# Patient Record
Sex: FEMALE | Race: WHITE | Employment: FULL TIME | URBAN - NONMETROPOLITAN AREA
[De-identification: names, ages, dates, MRNs, and addresses within clinical notes are randomized per-mention and may not be internally consistent; named-entity substitution may affect disease eponyms.]

---

## 2017-12-18 ENCOUNTER — HOSPITAL ENCOUNTER (OUTPATIENT)
Age: 27
Setting detail: SPECIMEN
Discharge: HOME | End: 2017-12-18
Payer: COMMERCIAL

## 2017-12-18 ENCOUNTER — OFFICE VISIT (OUTPATIENT)
Dept: OBGYN | Age: 27
End: 2017-12-18
Payer: COMMERCIAL

## 2017-12-18 VITALS
SYSTOLIC BLOOD PRESSURE: 128 MMHG | DIASTOLIC BLOOD PRESSURE: 80 MMHG | BODY MASS INDEX: 25.68 KG/M2 | HEIGHT: 66 IN | WEIGHT: 159.8 LBS

## 2017-12-18 DIAGNOSIS — Z01.419 WOMEN'S ANNUAL ROUTINE GYNECOLOGICAL EXAMINATION: ICD-10-CM

## 2017-12-18 DIAGNOSIS — Z01.419 WOMEN'S ANNUAL ROUTINE GYNECOLOGICAL EXAMINATION: Primary | ICD-10-CM

## 2017-12-18 LAB
BILIRUBIN, POC: NORMAL
BLOOD URINE, POC: NORMAL
CLARITY, POC: CLEAR
COLOR, POC: YELLOW
GLUCOSE URINE, POC: NORMAL
HGB, POC: 12.5
KETONES, POC: NORMAL
LEUKOCYTE EST, POC: NORMAL
NITRITE, POC: NORMAL
PH, POC: 6
PROTEIN, POC: NORMAL
SPECIFIC GRAVITY, POC: 1
UROBILINOGEN, POC: 0.2

## 2017-12-18 PROCEDURE — G0145 SCR C/V CYTO,THINLAYER,RESCR: HCPCS

## 2017-12-18 PROCEDURE — 99395 PREV VISIT EST AGE 18-39: CPT | Performed by: ADVANCED PRACTICE MIDWIFE

## 2017-12-18 RX ORDER — NORGESTIMATE AND ETHINYL ESTRADIOL 0.25-0.035
1 KIT ORAL DAILY
Qty: 1 PACKET | Refills: 12 | Status: SHIPPED | OUTPATIENT
Start: 2017-12-18 | End: 2018-12-04 | Stop reason: ALTCHOICE

## 2017-12-18 ASSESSMENT — ENCOUNTER SYMPTOMS
CONSTIPATION: 0
VOMITING: 0
NAUSEA: 0
SORE THROAT: 0
BACK PAIN: 0
SHORTNESS OF BREATH: 0
ABDOMINAL PAIN: 0
DIARRHEA: 0

## 2017-12-18 ASSESSMENT — PATIENT HEALTH QUESTIONNAIRE - PHQ9
2. FEELING DOWN, DEPRESSED OR HOPELESS: 0
1. LITTLE INTEREST OR PLEASURE IN DOING THINGS: 0
SUM OF ALL RESPONSES TO PHQ QUESTIONS 1-9: 0
SUM OF ALL RESPONSES TO PHQ9 QUESTIONS 1 & 2: 0

## 2017-12-18 NOTE — PROGRESS NOTES
YEARLY PHYSICAL    Date of service: 2017    Amy Kim  Is a 32 y.o.  single female    PT's PCP is: No primary care provider on file. : 1990                                             Subjective:       Patient's last menstrual period was 2017 (exact date). Are your menses regular: yes    OB History   No data available        History   Smoking Status    Never Smoker   Smokeless Tobacco    Never Used        History   Alcohol Use    Yes     Comment: socially       Family History   Problem Relation Age of Onset    Diabetes Mother      gestational   [facility] Diabetes Sister      gestastional       Any family history of breast or ovarian cancer: No    Any family history of blood clots: No      Allergies: Review of patient's allergies indicates no known allergies. Current Outpatient Prescriptions:     SPRINTEC 28 0.25-35 MG-MCG per tablet, , Disp: , Rfl:     norgestimate-ethinyl estradiol (ORTHO-CYCLEN, 28,) 0.25-35 MG-MCG per tablet, Take 1 tablet by mouth daily, Disp: 1 packet, Rfl: 12    History   Sexual Activity    Sexual activity: Yes    Partners: Male    Birth control/ protection: Pill       Any bleeding or pain with intercourse: No    Last Yearly:  2014    Last pap: 2014    Last HPV: yes, to abnormal, but unsure of reason     Last Mammogram: n/a    Last Dexascan n/a    Do you do self breast exams: No    History reviewed. No pertinent past medical history. History reviewed. No pertinent surgical history. Family History   Problem Relation Age of Onset    Diabetes Mother      gestational   [facility] Diabetes Sister      gestastional       Chief Complaint   Patient presents with    Gynecologic Exam     Pap, Pt states no issues or complaints           PE:  Vital Signs  Blood pressure 128/80, height 5' 6\" (1.676 m), weight 159 lb 12.8 oz (72.5 kg), last menstrual period 2017, not currently breastfeeding.

## 2017-12-29 LAB — CYTOLOGY REPORT: NORMAL

## 2018-12-04 ENCOUNTER — OFFICE VISIT (OUTPATIENT)
Dept: OBGYN | Age: 28
End: 2018-12-04
Payer: COMMERCIAL

## 2018-12-04 VITALS
HEIGHT: 66 IN | BODY MASS INDEX: 24.91 KG/M2 | DIASTOLIC BLOOD PRESSURE: 80 MMHG | WEIGHT: 155 LBS | SYSTOLIC BLOOD PRESSURE: 138 MMHG

## 2018-12-04 DIAGNOSIS — Z31.69 PROCREATIVE MANAGEMENT COUNSELING: ICD-10-CM

## 2018-12-04 DIAGNOSIS — Z01.419 WOMEN'S ANNUAL ROUTINE GYNECOLOGICAL EXAMINATION: Primary | ICD-10-CM

## 2018-12-04 PROCEDURE — 99395 PREV VISIT EST AGE 18-39: CPT | Performed by: ADVANCED PRACTICE MIDWIFE

## 2018-12-04 ASSESSMENT — PATIENT HEALTH QUESTIONNAIRE - PHQ9
1. LITTLE INTEREST OR PLEASURE IN DOING THINGS: 0
SUM OF ALL RESPONSES TO PHQ QUESTIONS 1-9: 0
SUM OF ALL RESPONSES TO PHQ QUESTIONS 1-9: 0
SUM OF ALL RESPONSES TO PHQ9 QUESTIONS 1 & 2: 0
2. FEELING DOWN, DEPRESSED OR HOPELESS: 0

## 2018-12-04 ASSESSMENT — ENCOUNTER SYMPTOMS
DIARRHEA: 0
ABDOMINAL PAIN: 0
NAUSEA: 0
BACK PAIN: 0
CONSTIPATION: 0
SORE THROAT: 0
SHORTNESS OF BREATH: 0

## 2018-12-04 NOTE — PROGRESS NOTES
Masses absent                                    Vaginal discharge: no vaginal discharge   Assessment and Plan          Diagnosis Orders   1. Women's annual routine gynecological examination     2. Procreative management counseling               I have discontinued Ms. Baker's SPRINTEC 28 and norgestimate-ethinyl estradiol. I am also having her maintain her Multiple Vitamins-Minerals (MULTIVITAMIN ADULTS PO). Return in about 1 year (around 12/4/2019) for yearly. There are no Patient Instructions on file for this visit. Over 50% of time spent on counseling and care coordination on: see assessment and plan,  She was also counseled on her preventative health maintenance recommendations and follow-up.         FF time: 15 min      Brenda Kaur,12/4/2018 4:15 PM

## 2020-10-28 ENCOUNTER — OFFICE VISIT (OUTPATIENT)
Dept: OBGYN CLINIC | Age: 30
End: 2020-10-28
Payer: COMMERCIAL

## 2020-10-28 ENCOUNTER — HOSPITAL ENCOUNTER (OUTPATIENT)
Age: 30
Setting detail: SPECIMEN
Discharge: HOME | End: 2020-10-28
Payer: COMMERCIAL

## 2020-10-28 VITALS
HEIGHT: 67 IN | BODY MASS INDEX: 24.52 KG/M2 | SYSTOLIC BLOOD PRESSURE: 131 MMHG | DIASTOLIC BLOOD PRESSURE: 82 MMHG | WEIGHT: 156.2 LBS

## 2020-10-28 PROCEDURE — 99395 PREV VISIT EST AGE 18-39: CPT | Performed by: NURSE PRACTITIONER

## 2020-10-28 NOTE — PROGRESS NOTES
YEARLY PHYSICAL    Date of service: 10/28/2020    Ed Zaragoza  Is a 26 y.o.  female    PT's PCP is: Nadya Reyna DO     : 1990                                         Chaperone for Intimate Exam   Chaperone was offered as part of the rooming process. Patient declined and agrees to continue with exam without a chaperone. Subjective:       No LMP recorded. (Menstrual status: Other - See Notes). Are your menses regular: no menses, still exclusively breastfeeding     OB History    Para Term  AB Living   1 1 1 0 0 1   SAB TAB Ectopic Molar Multiple Live Births   0 0 0 0 0 1      # Outcome Date GA Lbr Enrique/2nd Weight Sex Delivery Anes PTL Lv   1 Term 20 [id number]   F Vag-Spont   LOIS        Social History     Tobacco Use   Smoking Status Never Smoker   Smokeless Tobacco Never Used        Social History     Substance and Sexual Activity   Alcohol Use Yes    Comment: socially       Family History   Problem Relation Age of Onset    Diabetes Mother         gestational   [facility] [place] Diabetes Sister         gestastional    Diabetes Maternal Grandmother     Hypertension Maternal Grandmother     Other Other         No family h/o ovarian or breast cancer. No family h/o DVt. Any family history of breast or ovarian cancer: No    Any family history of blood clots: No      Allergies: Patient has no known allergies. No current outpatient medications on file. Social History     Substance and Sexual Activity   Sexual Activity Yes    Partners: Male       Any bleeding or pain with intercourse: No    Last Yearly:      Last pap: 2017    Last HPV: n/a    Last Mammogram: n/a    Last Dexascan n/a    Last colorectal screen- n/a     Do you do self breast exams: occasionally     Past Medical History:   Diagnosis Date    Abnormal Pap smear of cervix     Anxiety     H/O human papillomavirus infection        History reviewed. No pertinent surgical history. Family History   Problem Relation Age of Onset    Diabetes Mother         gestational   [facility] Diabetes Sister         gestastional    Diabetes Maternal Grandmother     Hypertension Maternal Grandmother     Other Other         No family h/o ovarian or breast cancer. No family h/o DVt. Chief Complaint   Patient presents with    Gynecologic Exam     Pap due. PE:  Vital Signs  Blood pressure 131/82, height 5' 7\" (1.702 m), weight 156 lb 3.2 oz (70.9 kg), currently breastfeeding. Estimated body mass index is 24.46 kg/m² as calculated from the following:    Height as of this encounter: 5' 7\" (1.702 m). Weight as of this encounter: 156 lb 3.2 oz (70.9 kg). HPI: Patient here for annual exam. Feeling well, voices no concerns. Due for pap. Still exclusively nursing baby, no menses has returned. Review of Systems   All other systems reviewed and are negative. Objective  Lymphatic:   no lymphadenopathy  Heent:   negative   Cor: regular rate and rhythm, no murmurs              Pul:clear to auscultation bilaterally- no wheezes, rales or rhonchi, normal air movement, no respiratory distress      GI: Abdomen soft, non-tender. BS normal. No masses,  No organomegaly           Extremities: normal strength, tone, and muscle mass, ROM of all joints is normal   Breasts: Breast:normal appearance, no masses or tenderness, No nipple retraction or dimpling, No nipple discharge or bleeding   Pelvic Exam: GENITAL/URINARY:  External Genitalia:  General appearance; normal, Hair distribution; normal, Lesions absent  Urethral Meatus:  Size normal, Location normal, Lesions absent, Prolapse absent  Urethra:   Fullness absent, Masses absent  Bladder:  Fullness absent, Masses absent, Tenderness absent, Cystocele absent  Vagina:  General appearance normal, Estrogen effect normal, Discharge absent, Lesions absent, Pelvic support normal  Cervix:  General appearance normal, Lesions absent,

## 2020-10-31 LAB
HPV SOURCE: NORMAL
HPV, GENOTYPE 16: NOT DETECTED
HPV, GENOTYPE 18: NOT DETECTED
HPV, HIGH RISK OTHER: NOT DETECTED

## 2020-11-05 LAB — CYTOLOGY REPORT: NORMAL
